# Patient Record
Sex: FEMALE | Race: WHITE | NOT HISPANIC OR LATINO | Employment: UNEMPLOYED | URBAN - METROPOLITAN AREA
[De-identification: names, ages, dates, MRNs, and addresses within clinical notes are randomized per-mention and may not be internally consistent; named-entity substitution may affect disease eponyms.]

---

## 2020-07-07 ENCOUNTER — OFFICE VISIT (OUTPATIENT)
Dept: OBSTETRICS AND GYNECOLOGY | Age: 65
End: 2020-07-07

## 2020-07-07 VITALS
BODY MASS INDEX: 24.27 KG/M2 | SYSTOLIC BLOOD PRESSURE: 128 MMHG | DIASTOLIC BLOOD PRESSURE: 80 MMHG | HEIGHT: 63 IN | WEIGHT: 137 LBS

## 2020-07-07 DIAGNOSIS — Z12.4 SCREENING FOR MALIGNANT NEOPLASM OF CERVIX: ICD-10-CM

## 2020-07-07 DIAGNOSIS — Z78.0 MENOPAUSE: Primary | ICD-10-CM

## 2020-07-07 DIAGNOSIS — Z12.31 SCREENING MAMMOGRAM, ENCOUNTER FOR: ICD-10-CM

## 2020-07-07 PROBLEM — Z72.0 TOBACCO USE: Status: ACTIVE | Noted: 2020-07-07

## 2020-07-07 PROCEDURE — 99396 PREV VISIT EST AGE 40-64: CPT | Performed by: OBSTETRICS & GYNECOLOGY

## 2020-07-07 RX ORDER — HYDROCODONE BITARTRATE AND ACETAMINOPHEN 10; 325 MG/1; MG/1
1 TABLET ORAL EVERY 6 HOURS PRN
COMMUNITY

## 2020-07-07 RX ORDER — CAMPHOR/EUCALYPT/MENTH/BENZOIN
LIQUID (ML) MISCELLANEOUS
COMMUNITY

## 2020-07-07 RX ORDER — GABAPENTIN 600 MG/1
600 TABLET ORAL 3 TIMES DAILY
COMMUNITY

## 2020-07-07 RX ORDER — ACETAMINOPHEN 500 MG
500 TABLET ORAL EVERY 6 HOURS PRN
COMMUNITY

## 2020-07-07 RX ORDER — MULTIPLE VITAMINS W/ MINERALS TAB 9MG-400MCG
1 TAB ORAL DAILY
COMMUNITY

## 2020-07-07 RX ORDER — SODIUM BICARBONATE 650 MG/1
650 TABLET ORAL 4 TIMES DAILY
COMMUNITY

## 2020-07-07 RX ORDER — ASPIRIN 81 MG/1
81 TABLET, CHEWABLE ORAL DAILY
COMMUNITY

## 2020-07-07 RX ORDER — LISINOPRIL 20 MG/1
20 TABLET ORAL DAILY
COMMUNITY

## 2020-07-07 RX ORDER — ATORVASTATIN CALCIUM 80 MG/1
80 TABLET, FILM COATED ORAL DAILY
COMMUNITY

## 2020-07-07 RX ORDER — ALENDRONATE SODIUM 10 MG/1
10 TABLET ORAL
COMMUNITY

## 2020-07-07 RX ORDER — BUSPIRONE HYDROCHLORIDE 5 MG/1
5 TABLET ORAL 3 TIMES DAILY
COMMUNITY

## 2020-07-07 RX ORDER — RISPERIDONE 0.5 MG/1
0.5 TABLET ORAL 2 TIMES DAILY
COMMUNITY

## 2020-07-07 NOTE — PROGRESS NOTES
Routine Annual Visit    2020    Patient: Libertad Beltrán          MR#:[id number]      Chief Complaint   Patient presents with   • Gynecologic Exam     New pt. last pap not on file. MG 2019 at [facility]. colonoscopy 4 yrs ago at [facility].  dexa 2019 at [facility]. no complaints today        History of Present Illness    64 y.o. female  who presents for annual exam.   Menopause around age 49, no hot flashes now but were terrible in the beginning  Had stroke , left side was weak and had to learn to walk again.  Osteoporosis and is on fosamax, takes calcium and vit d supplement    Health Maintenance  Gardasil none  Last pap: , history abnormal: none  Mammogram: yearly last , at [facility], history abnormal: none  Colonoscopy  normal, repeat due 10 yrs per pt  Family history of Breast, ovarian, uterine, colon, pancreatic cancer: no  DEXA: osteoporosis managed by Dr Santos    Current contraception: PM    No LMP recorded. Patient is postmenopausal.  Obstetric History:  OB History        3    Para   3    Term   3            AB        Living   3       SAB        TAB        Ectopic        Molar        Multiple        Live Births   3               Menstrual History:     No LMP recorded. Patient is postmenopausal.       ________________________________________  There is no problem list on file for this patient.      Past Medical History:   Diagnosis Date   • Anxiety    • Brain aneurysm    • Depression    • Hypercholesteremia    • Hypertension    • Osteoporosis    • Rheumatic fever    • Stroke (CMS/HCC) 2016    some residual left sided weakness   • Tibia/fibula fracture 2018    residual pain   • Tobacco use        Family History   Problem Relation Age of Onset   • Osteoporosis Sister    • Stroke Sister    • Heart attack Sister    • Heart attack Father    • Heart attack Mother    • Diabetes Sister    • Lung cancer Sister    • Breast cancer Neg Hx    • Ovarian cancer Neg Hx    • Uterine cancer  "Neg Hx    • Colon cancer Neg Hx        Past Surgical History:   Procedure Laterality Date   • BRAIN SURGERY  2015    stent placed    • BREAST BIOPSY     • BREAST BIOPSY     • BREAST BIOPSY     • ENDOSCOPY     • LEG SURGERY Left        Social History     Tobacco Use   Smoking Status Current Every Day Smoker   • Packs/day: 1.50   • Years: 49.00   • Pack years: 73.50   • Types: Cigarettes   Smokeless Tobacco Never Used       has a current medication list which includes the following prescription(s): acetaminophen, acetaminophen, alendronate, aspirin, atorvastatin, buspirone, calcium carbonate-vitamin d, vitamin d3, citalopram hydrobromide, gabapentin, hydrocodone-acetaminophen, lisinopril, multiple vitamins-minerals, multivitamin with minerals, sm complete 50+, risperidone, and sodium bicarbonate.  ________________________________________      The following portions of the patient's history were reviewed and updated as appropriate: allergies, current medications, past family history, past medical history, past social history, past surgical history and problem list.    Review of Systems   Constitutional: Negative for fever and unexpected weight change.   Respiratory: Negative for shortness of breath.    Cardiovascular: Negative for chest pain.   Gastrointestinal: Negative for abdominal pain, constipation and diarrhea.   Genitourinary: Negative for frequency and urgency.   Neurological: Negative for dizziness, facial asymmetry and headaches.   Hematological: Negative for adenopathy.   Psychiatric/Behavioral: Negative for dysphoric mood.       Objective   Physical Exam    /80   Ht 160 cm (63\")   Wt 62.1 kg (137 lb)   Breastfeeding No   BMI 24.27 kg/m²    BP Readings from Last 3 Encounters:   07/07/20 128/80      Wt Readings from Last 3 Encounters:   07/07/20 62.1 kg (137 lb)         BMI: Body mass index is 24.27 kg/m².       General:   alert, appears stated age and cooperative   Neck: No thyromegaly or LAD, no " carotid bruit noted   Heart:: regular rate and rhythm, S1, S2 normal, no murmur, click, rub or gallop   Lungs: normal respiratory effort and auscultation   Abdomen: soft, non-tender, without masses or organomegaly   Breast: inspection negative, no nipple discharge or bleeding, no masses or nodularity palpable   Urethra and bladder: urethral meatus normal; bladder nontender to palpation;   Vulva: normal, Bartholin's, Urethra, [place]'s normal, dryness   Vagina: normal discharge, atrophic but normal mucosa   Cervix: multiparous appearance and no lesions   Uterus: normal size or anteverted   Adnexa: normal adnexa and no mass, fullness, tenderness       Assessment:    normal annual exam  Tobacco use  menopause     Plan:    Plan     [x]  Mammogram request made  [x]  PAP done   []  Labs:   []  GC/Chl/TV  []  DEXA scan   []  Referral for colonoscopy:     Asymptomatic from atrophic vagina  Not SA  Every other year exams   Not willing to quit smoking though counseled regarding health risks    Counseling  [x]  Nutrition  [x]  Physical activity/regular exercise   [x]  Healthy weight  [x]  Injury prevention  []  Smoking cessation  []  Substance misuse/abuse  []  Sexual behavior  []  STD prevention  []  Contraception  []  Dental health  [x]  Mental health  []  Immunization  [x]  Encouraged PADMINIE        Rona Willis MD  07/07/2020  10:15

## 2020-07-10 LAB
CONV .: NORMAL
CYTOLOGIST CVX/VAG CYTO: NORMAL
CYTOLOGY CVX/VAG DOC CYTO: NORMAL
CYTOLOGY CVX/VAG DOC THIN PREP: NORMAL
DX ICD CODE: NORMAL
HIV 1 & 2 AB SER-IMP: NORMAL
OTHER STN SPEC: NORMAL
STAT OF ADQ CVX/VAG CYTO-IMP: NORMAL

## 2024-06-03 ENCOUNTER — PATIENT ROUNDING (BHMG ONLY) (OUTPATIENT)
Dept: OBSTETRICS AND GYNECOLOGY | Age: 69
End: 2024-06-03
Payer: MEDICARE

## 2024-06-03 ENCOUNTER — OFFICE VISIT (OUTPATIENT)
Dept: OBSTETRICS AND GYNECOLOGY | Age: 69
End: 2024-06-03
Payer: MEDICARE

## 2024-06-03 VITALS
WEIGHT: 139 LBS | DIASTOLIC BLOOD PRESSURE: 76 MMHG | SYSTOLIC BLOOD PRESSURE: 134 MMHG | HEIGHT: 64 IN | BODY MASS INDEX: 23.73 KG/M2

## 2024-06-03 DIAGNOSIS — Z12.31 SCREENING MAMMOGRAM FOR BREAST CANCER: ICD-10-CM

## 2024-06-03 DIAGNOSIS — Z01.419 WELL FEMALE EXAM WITH ROUTINE GYNECOLOGICAL EXAM: Primary | ICD-10-CM

## 2024-06-03 DIAGNOSIS — Z13.89 SCREENING FOR BLOOD OR PROTEIN IN URINE: ICD-10-CM

## 2024-06-03 PROBLEM — F10.10 ALCOHOL ABUSE: Status: ACTIVE | Noted: 2024-06-03

## 2024-06-03 PROBLEM — I63.9 CEREBROVASCULAR ACCIDENT (CVA): Status: ACTIVE | Noted: 2024-06-03

## 2024-06-03 LAB
BILIRUB BLD-MCNC: NEGATIVE MG/DL
CLARITY, POC: CLEAR
COLOR UR: YELLOW
GLUCOSE UR STRIP-MCNC: NEGATIVE MG/DL
KETONES UR QL: NEGATIVE
LEUKOCYTE EST, POC: NEGATIVE
NITRITE UR-MCNC: NEGATIVE MG/ML
PH UR: 7 [PH] (ref 5–8)
PROT UR STRIP-MCNC: NEGATIVE MG/DL
RBC # UR STRIP: NEGATIVE /UL
SP GR UR: 1.01 (ref 1–1.03)
UROBILINOGEN UR QL: NORMAL

## 2024-06-03 PROCEDURE — 81002 URINALYSIS NONAUTO W/O SCOPE: CPT | Performed by: OBSTETRICS & GYNECOLOGY

## 2024-06-03 PROCEDURE — 99386 PREV VISIT NEW AGE 40-64: CPT | Performed by: OBSTETRICS & GYNECOLOGY

## 2024-06-03 PROCEDURE — 1160F RVW MEDS BY RX/DR IN RCRD: CPT | Performed by: OBSTETRICS & GYNECOLOGY

## 2024-06-03 PROCEDURE — 1159F MED LIST DOCD IN RCRD: CPT | Performed by: OBSTETRICS & GYNECOLOGY

## 2024-06-03 RX ORDER — CYCLOBENZAPRINE HCL 5 MG
5 TABLET ORAL
COMMUNITY
Start: 2024-03-26

## 2024-06-03 RX ORDER — HYDROXYZINE HYDROCHLORIDE 25 MG/1
25 TABLET, FILM COATED ORAL
COMMUNITY
Start: 2024-05-21

## 2024-06-03 RX ORDER — SERTRALINE HYDROCHLORIDE 100 MG/1
50 TABLET, FILM COATED ORAL
COMMUNITY
Start: 2024-05-30

## 2024-06-03 NOTE — PROGRESS NOTES
[facility]   Obstetrics and Gynecology     6/3/2024    Patient: Libertad Beltrán          MR#:[id number]    History of Present Illness    Chief Complaint   Patient presents with    Gynecologic Exam     CC: New Annual, last pap 20 neg       68 y.o. female  who presents for annual exam.    The patient presents for her routine check feeling well.  She has a history of alcoholism with  cessation since .  She continues to use tobacco products with history of stroke but reports no other gynecologic problems.    Relevant data reviewed:    No LMP recorded. Patient is postmenopausal.  Obstetric History:  OB History          3    Para   3    Term   3            AB        Living   3         SAB        IAB        Ectopic        Molar        Multiple        Live Births   3               Menstrual History:     No LMP recorded. Patient is postmenopausal.       Social History     Substance and Sexual Activity   Sexual Activity Not Currently     ______________________________________  Patient Active Problem List   Diagnosis    Menopause    Tobacco use    Alcohol abuse    Cerebrovascular accident (CVA)     Past Medical History:   Diagnosis Date    Anxiety     Brain aneurysm 2015    Depression     Hypercholesteremia     Hypertension     Osteoporosis     Rheumatic fever     Stroke 2016    some residual left sided weakness    Tibia/fibula fracture 2018    residual pain    Tobacco use      Past Surgical History:   Procedure Laterality Date    BRAIN SURGERY  2015    stent placed     BREAST BIOPSY      BREAST BIOPSY      BREAST BIOPSY      ENDOSCOPY      LEG SURGERY Left      Social History     Tobacco Use   Smoking Status Every Day    Current packs/day: 1.50    Average packs/day: 1.5 packs/day for 49.0 years (73.5 ttl pk-yrs)    Types: Cigarettes    Passive exposure: Current   Smokeless Tobacco Never     Family History   Problem Relation Age of Onset    Osteoporosis Sister     Stroke Sister      Heart attack Sister     Heart attack Father     Heart attack Mother     Diabetes Sister     Lung cancer Sister     Breast cancer Neg Hx     Ovarian cancer Neg Hx     Uterine cancer Neg Hx     Colon cancer Neg Hx      Prior to Admission medications    Medication Sig Start Date End Date Taking? Authorizing Provider   alendronate (FOSAMAX) 10 MG tablet Take 1 tablet by mouth Every Morning Before Breakfast.   Yes Lizet Marinelli MD   aspirin 81 MG chewable tablet Chew 1 tablet Daily.   Yes Lizet Marinelli MD   atorvastatin (LIPITOR) 80 MG tablet Take 1 tablet by mouth Daily.   Yes Lizet Marinelli MD   busPIRone (BUSPAR) 5 MG tablet Take 1 tablet by mouth 3 (Three) Times a Day.   Yes Lizet Marinelli MD   Calcium Carbonate-Vitamin D 600-200 MG-UNIT tablet Take  by mouth.   Yes Lizet Marinelli MD   Cholecalciferol (VITAMIN D3) 25 MCG (1000 UT) capsule Take  by mouth.   Yes Lizet Marinelli MD   cyclobenzaprine (FLEXERIL) 5 MG tablet Take 1 tablet by mouth. 3/26/24  Yes Lizet Marinelli MD   gabapentin (NEURONTIN) 600 MG tablet Take 1 tablet by mouth 3 (Three) Times a Day.   Yes Lizet Marinelli MD   hydrOXYzine (ATARAX) 25 MG tablet Take 1 tablet by mouth. 2/22/24  Yes Lizet Marinelli MD   hydrOXYzine (ATARAX) 25 MG tablet 1 tablet. 5/21/24  Yes Lizet Marinelli MD   lisinopril (PRINIVIL,ZESTRIL) 20 MG tablet Take 1 tablet by mouth Daily.   Yes Lizet Marinelli MD   Multiple Vitamins-Minerals (CEROVITE ADVANCED FORMULA PO) Take  by mouth.   Yes Lizet Marinelli MD   risperiDONE (risperDAL) 0.5 MG tablet Take 1 tablet by mouth 2 (Two) Times a Day.   Yes Lizet Marinelli MD   sertraline (ZOLOFT) 100 MG tablet 0.5 tablets. 5/30/24  Yes Lizet Marinelli MD   zolpidem (AMBIEN) 5 MG tablet Take 1 tablet by mouth. 2/27/24  Yes Lizet Marinelli MD   Acetaminophen (Mapap) 500 MG capsule Take  by mouth.  Patient not taking: Reported on 6/3/2024     "Lizet Marinelli MD   acetaminophen (RA Acetaminophen Ex St) 500 MG tablet Take 500 mg by mouth Every 6 (Six) Hours As Needed for Mild Pain .  Patient not taking: Reported on 6/3/2024    Lizet Marinelli MD   cephalexin (KEFLEX) 500 MG capsule  3/4/24   Lizet Marinelli MD   CITALOPRAM HYDROBROMIDE PO Take 40 mcg by mouth.  Patient not taking: Reported on 6/3/2024    Lizet Marinelli MD   clindamycin (CLEOCIN) 300 MG capsule  12/26/23   Lizet Marinelli MD   HYDROcodone-acetaminophen (NORCO)  MG per tablet Take 1 tablet by mouth Every 6 (Six) Hours As Needed for Moderate Pain.  Patient not taking: Reported on 6/3/2024    Lizet Marinelli MD   Multiple Vitamins-Minerals (MULTIVITAMIN WITH MINERALS) tablet tablet Take 1 tablet by mouth Daily.  Patient not taking: Reported on 6/3/2024    Lizet Marinelli MD   Multiple Vitamins-Minerals (SM COMPLETE 50+) tablet Take  by mouth.  Patient not taking: Reported on 6/3/2024    Lizet Marinelli MD   sodium bicarbonate 650 MG tablet Take 1 tablet by mouth 4 (Four) Times a Day.  Patient not taking: Reported on 6/3/2024    Lizet Marinelli MD     _______________________________________    Current contraception: post menopausal status  History of abnormal Pap smear: no  Family history of uterine or ovarian cancer: no  Family History of colon cancer/colon polyps: no  History of abnormal mammogram: no  History of abnormal lipids: yes - monitored    The following portions of the patient's history were reviewed and updated as appropriate: allergies, current medications, past family history, past medical history, past social history, past surgical history, and problem list.    Review of Systems    Pertinent items are noted in HPI.       Objective   Physical Exam    /76   Ht 162.6 cm (64\")   Wt 63 kg (139 lb)   BMI 23.86 kg/m²    BP Readings from Last 3 Encounters:   06/03/24 134/76   03/16/24 (!) 163/108   07/07/20 128/80    " "  Wt Readings from Last 3 Encounters:   24 63 kg (139 lb)   24 54.4 kg (120 lb)   20 62.1 kg (137 lb)        BMI: Estimated body mass index is 23.86 kg/m² as calculated from the following:    Height as of this encounter: 162.6 cm (64\").    Weight as of this encounter: 63 kg (139 lb).       General: alert, appears stated age, and cooperative   Heart: regular rate and rhythm, S1, S2 normal, no murmur, click, rub or gallop   Lungs: clear to auscultation bilaterally   Abdomen: soft, non-tender, without masses, no organomegaly   Breast: inspection negative, no nipple discharge or bleeding, no masses or nodularity palpable   External genitalia/Vulva: moderate atrophy, External genitalia including bartholin's glands, Urethra, [place]'s gland and urethra meatus are normal, Perineum, rectum and anus appear normal , and Bladder appears normal without significant prolapse    Vagina: normal mucosa, normal discharge   Cervix: no lesions   Uterus: normal size and non-tender   Adnexa: normal adnexa     As part of wellness and prevention, the following topics were discussed with the patient:  Encouraged self breast exam  Physical activity and regular exercised encouraged.   Smoking cessation discussed.  Advance Care Planning   ACP discussion was held with the patient during this visit.           Problem List   Meds  History  Prep for Surg   Imagin}    Assessment:  Diagnoses and all orders for this visit:    1. Well female exam with routine gynecological exam (Primary)    2. Screening mammogram for breast cancer  -     Mammo Screening Digital Tomosynthesis Bilateral With CAD; Future    3. Screening for blood or protein in urine  -     POC Urinalysis Dipstick      Plan:  Return in 1 year (on 6/3/2025) for Annual exam.    Rogelio Morris MD  6/3/2024 13:07 EDT  "